# Patient Record
Sex: MALE | Race: WHITE | NOT HISPANIC OR LATINO | URBAN - METROPOLITAN AREA
[De-identification: names, ages, dates, MRNs, and addresses within clinical notes are randomized per-mention and may not be internally consistent; named-entity substitution may affect disease eponyms.]

---

## 2024-03-06 ENCOUNTER — OV NP (OUTPATIENT)
Dept: URBAN - METROPOLITAN AREA CLINIC 35 | Facility: CLINIC | Age: 54
End: 2024-03-06
Payer: COMMERCIAL

## 2024-03-06 ENCOUNTER — LAB (OUTPATIENT)
Dept: URBAN - METROPOLITAN AREA CLINIC 35 | Facility: CLINIC | Age: 54
End: 2024-03-06

## 2024-03-06 VITALS
HEIGHT: 68 IN | HEART RATE: 82 BPM | WEIGHT: 206 LBS | BODY MASS INDEX: 31.22 KG/M2 | OXYGEN SATURATION: 97 % | SYSTOLIC BLOOD PRESSURE: 122 MMHG | DIASTOLIC BLOOD PRESSURE: 86 MMHG

## 2024-03-06 DIAGNOSIS — R12 HEARTBURN: ICD-10-CM

## 2024-03-06 DIAGNOSIS — Z12.11 SCREENING FOR COLON CANCER: ICD-10-CM

## 2024-03-06 DIAGNOSIS — Z80.0 FAMILY HISTORY OF ESOPHAGEAL CANCER: ICD-10-CM

## 2024-03-06 PROCEDURE — 99203 OFFICE O/P NEW LOW 30 MIN: CPT | Performed by: STUDENT IN AN ORGANIZED HEALTH CARE EDUCATION/TRAINING PROGRAM

## 2024-03-06 RX ORDER — OMEGA-3/DHA/EPA/FISH OIL 60 MG-90MG
1 CAPSULE CAPSULE ORAL ONCE A DAY
Status: ACTIVE | COMMUNITY

## 2024-03-06 RX ORDER — LISINOPRIL 20 MG/1
1 TABLET TABLET ORAL ONCE A DAY
Status: ACTIVE | COMMUNITY

## 2024-03-06 RX ORDER — MULTIVITAMIN
1 TABLET TABLET ORAL ONCE A DAY
Status: ACTIVE | COMMUNITY

## 2024-03-06 NOTE — HPI-TODAY'S VISIT:
53 year old male new patient with family history of esophageal cancer (father) presents today for a consultation for Colorectal cancer screen. He has no previous history of Colonoscopy/EGD, patient reports 1-2 bowel movement per day with no blood, mucus or melena in stool. Stools are normal, no straining Denies rectal pain or pruritus ani and any related. No history of complications with anesthesia, no family hx of CRC. History intermittent heartburn with certain foods, like spicy foods, but doesn't take anything regularly Reports relatively infrequent. No issues swallowing. No history smoking.

## 2024-03-25 ENCOUNTER — COL/EGD (OUTPATIENT)
Dept: URBAN - METROPOLITAN AREA SURGERY CENTER 8 | Facility: SURGERY CENTER | Age: 54
End: 2024-03-25

## 2024-03-25 ENCOUNTER — LAB (OUTPATIENT)
Dept: URBAN - METROPOLITAN AREA CLINIC 4 | Facility: CLINIC | Age: 54
End: 2024-03-25
Payer: COMMERCIAL

## 2024-03-25 DIAGNOSIS — K63.89 OTHER SPECIFIED DISEASES OF INTESTINE: ICD-10-CM

## 2024-03-25 PROBLEM — 266433003: Status: ACTIVE | Noted: 2024-03-25

## 2024-03-25 PROCEDURE — 88305 TISSUE EXAM BY PATHOLOGIST: CPT | Performed by: PATHOLOGY

## 2024-03-25 RX ORDER — LISINOPRIL 20 MG/1
1 TABLET TABLET ORAL ONCE A DAY
Status: ACTIVE | COMMUNITY

## 2024-03-25 RX ORDER — OMEPRAZOLE 40 MG/1
1 CAPSULE 30 MINUTES BEFORE MORNING MEAL AND BEFORE SUPPER CAPSULE, DELAYED RELEASE ORAL TWICE A DAY
Qty: 180 | Refills: 2 | OUTPATIENT
Start: 2024-03-25

## 2024-03-25 RX ORDER — MULTIVITAMIN
1 TABLET TABLET ORAL ONCE A DAY
Status: ACTIVE | COMMUNITY

## 2024-03-25 RX ORDER — OMEGA-3/DHA/EPA/FISH OIL 60 MG-90MG
1 CAPSULE CAPSULE ORAL ONCE A DAY
Status: ACTIVE | COMMUNITY

## 2024-03-28 ENCOUNTER — COL/EGD (OUTPATIENT)
Dept: URBAN - METROPOLITAN AREA SURGERY CENTER 8 | Facility: SURGERY CENTER | Age: 54
End: 2024-03-28

## 2024-05-01 ENCOUNTER — LAB OUTSIDE AN ENCOUNTER (OUTPATIENT)
Dept: URBAN - METROPOLITAN AREA CLINIC 35 | Facility: CLINIC | Age: 54
End: 2024-05-01

## 2024-05-01 ENCOUNTER — OFFICE VISIT (OUTPATIENT)
Dept: URBAN - METROPOLITAN AREA CLINIC 35 | Facility: CLINIC | Age: 54
End: 2024-05-01
Payer: COMMERCIAL

## 2024-05-01 ENCOUNTER — DASHBOARD ENCOUNTERS (OUTPATIENT)
Age: 54
End: 2024-05-01

## 2024-05-01 VITALS
DIASTOLIC BLOOD PRESSURE: 88 MMHG | WEIGHT: 208 LBS | HEART RATE: 92 BPM | OXYGEN SATURATION: 97 % | HEIGHT: 68 IN | BODY MASS INDEX: 31.52 KG/M2 | SYSTOLIC BLOOD PRESSURE: 130 MMHG

## 2024-05-01 DIAGNOSIS — K21.00 GASTROESOPHAGEAL REFLUX DISEASE WITH ESOPHAGITIS WITHOUT HEMORRHAGE: ICD-10-CM

## 2024-05-01 DIAGNOSIS — Z12.11 SCREENING FOR COLON CANCER: ICD-10-CM

## 2024-05-01 DIAGNOSIS — Z80.0 FAMILY HISTORY OF ESOPHAGEAL CANCER: ICD-10-CM

## 2024-05-01 PROCEDURE — 99213 OFFICE O/P EST LOW 20 MIN: CPT | Performed by: STUDENT IN AN ORGANIZED HEALTH CARE EDUCATION/TRAINING PROGRAM

## 2024-05-01 RX ORDER — OMEGA-3/DHA/EPA/FISH OIL 60 MG-90MG
1 CAPSULE CAPSULE ORAL ONCE A DAY
Status: ACTIVE | COMMUNITY

## 2024-05-01 RX ORDER — MULTIVITAMIN
1 TABLET TABLET ORAL ONCE A DAY
Status: ACTIVE | COMMUNITY

## 2024-05-01 RX ORDER — LISINOPRIL 20 MG/1
1 TABLET TABLET ORAL ONCE A DAY
Status: ACTIVE | COMMUNITY

## 2024-05-01 RX ORDER — OMEPRAZOLE 40 MG/1
1 CAPSULE 30 MINUTES BEFORE MORNING MEAL AND BEFORE SUPPER CAPSULE, DELAYED RELEASE ORAL TWICE A DAY
Qty: 180 | Refills: 2 | Status: ACTIVE | COMMUNITY
Start: 2024-03-25

## 2024-06-27 ENCOUNTER — OFFICE VISIT (OUTPATIENT)
Dept: URBAN - METROPOLITAN AREA SURGERY CENTER 8 | Facility: SURGERY CENTER | Age: 54
End: 2024-06-27

## 2024-06-27 ENCOUNTER — OUT OF OFFICE VISIT (OUTPATIENT)
Dept: URBAN - METROPOLITAN AREA SURGERY CENTER 8 | Facility: SURGERY CENTER | Age: 54
End: 2024-06-27
Payer: COMMERCIAL

## 2024-06-27 DIAGNOSIS — K22.9 IRREGULAR Z LINE OF ESOPHAGUS: ICD-10-CM

## 2024-06-27 DIAGNOSIS — K20.80 ESOPHAGITIS, LOS ANGELES GRADE A: ICD-10-CM

## 2024-06-27 PROCEDURE — 00731 ANES UPR GI NDSC PX NOS: CPT | Performed by: NURSE ANESTHETIST, CERTIFIED REGISTERED

## 2024-06-27 RX ORDER — OMEGA-3/DHA/EPA/FISH OIL 60 MG-90MG
1 CAPSULE CAPSULE ORAL ONCE A DAY
Status: ACTIVE | COMMUNITY

## 2024-06-27 RX ORDER — MULTIVITAMIN
1 TABLET TABLET ORAL ONCE A DAY
Status: ACTIVE | COMMUNITY

## 2024-06-27 RX ORDER — OMEPRAZOLE 40 MG/1
1 CAPSULE 30 MINUTES BEFORE MORNING MEAL AND BEFORE SUPPER CAPSULE, DELAYED RELEASE ORAL TWICE A DAY
Qty: 180 | Refills: 2 | Status: ACTIVE | COMMUNITY
Start: 2024-03-25

## 2024-06-27 RX ORDER — LISINOPRIL 20 MG/1
1 TABLET TABLET ORAL ONCE A DAY
Status: ACTIVE | COMMUNITY

## 2024-07-17 ENCOUNTER — OFFICE VISIT (OUTPATIENT)
Dept: URBAN - METROPOLITAN AREA CLINIC 35 | Facility: CLINIC | Age: 54
End: 2024-07-17
Payer: COMMERCIAL

## 2024-07-17 VITALS
HEART RATE: 83 BPM | BODY MASS INDEX: 31.61 KG/M2 | DIASTOLIC BLOOD PRESSURE: 80 MMHG | SYSTOLIC BLOOD PRESSURE: 122 MMHG | OXYGEN SATURATION: 96 % | HEIGHT: 68 IN | WEIGHT: 208.6 LBS

## 2024-07-17 DIAGNOSIS — K21.00 GASTROESOPHAGEAL REFLUX DISEASE WITH ESOPHAGITIS WITHOUT HEMORRHAGE: ICD-10-CM

## 2024-07-17 DIAGNOSIS — Z80.0 FAMILY HISTORY OF ESOPHAGEAL CANCER: ICD-10-CM

## 2024-07-17 DIAGNOSIS — Z12.11 SCREENING FOR COLON CANCER: ICD-10-CM

## 2024-07-17 PROCEDURE — 99213 OFFICE O/P EST LOW 20 MIN: CPT | Performed by: STUDENT IN AN ORGANIZED HEALTH CARE EDUCATION/TRAINING PROGRAM

## 2024-07-17 RX ORDER — LISINOPRIL 20 MG/1
1 TABLET TABLET ORAL ONCE A DAY
Status: ACTIVE | COMMUNITY

## 2024-07-17 RX ORDER — OMEGA-3/DHA/EPA/FISH OIL 60 MG-90MG
1 CAPSULE CAPSULE ORAL ONCE A DAY
Status: ACTIVE | COMMUNITY

## 2024-07-17 RX ORDER — OMEPRAZOLE 40 MG/1
1 CAPSULE 30 MINUTES BEFORE MORNING MEAL AND BEFORE SUPPER CAPSULE, DELAYED RELEASE ORAL TWICE A DAY
Qty: 180 | Refills: 3
Start: 2024-03-25

## 2024-07-17 RX ORDER — MULTIVITAMIN
1 TABLET TABLET ORAL ONCE A DAY
Status: ACTIVE | COMMUNITY

## 2024-07-17 RX ORDER — OMEPRAZOLE 40 MG/1
1 CAPSULE 30 MINUTES BEFORE MORNING MEAL AND BEFORE SUPPER CAPSULE, DELAYED RELEASE ORAL TWICE A DAY
Qty: 180 | Refills: 2 | Status: ACTIVE | COMMUNITY
Start: 2024-03-25

## 2024-07-17 NOTE — HPI-TODAY'S VISIT:
53-year-old male presents for follow up for GERD with erosive esophagitis. Surveillance EGD as below. Taking omeprazole twice daily. Reflux symptoms well controlled. No breakthrough. Eating earlier before bed. Cut down caffeine, cut down alcohol intake. No dysphagia.    (6.27.24) EGD report shows: Normal hypopharynx. LA Grade A esophagitis. Z-line irregular. Biopsied. Esophagogastric landmarks identified. Gastroesophageal flap valve classified as Hill Grade III (minimal fold, loose to endoscope, hiatal hernia likely). Normal stomach. Normal duodenal bulb and second portion of the duodenum.    Path Esophagus, Distal, Biopsy (Cold Forceps): SQUAMOUS MUCOSA WITH REFLUX-TYPE CHANGES; NO COLUMNAR MUCOSA  IDENTIFIED. No Evidence of Mercer's Esophagus or Eosinophilic Esophagitis. Negative for Infectious organisms, Dysplasia or Malignancy.    Of last visit 5.1.24 53 year old male patient presents today for a follow-up post procedures(Colonoscopy/EGD) for GERD, CRC screening. Findings with LA grade D esophagiits, isolated benign mucosal sigmoid polyp. Taking omeprazole twice daily, no heartburn. No dysphagia.. No abdominal pain. BM normal. Has cut down caffeine. Tryng to get back in gym.   Colonoscopy 03/25/2024 - The descending colon, transverse colon, ascending colon and cecum are normal. - One 5 mm polyp in the sigmoid colon, removed with a cold snare.  Resected and retrieved. - The rectum is normal. - Internal hemorrhoids  EGD 03/25/2024 - Normal hypopharynx. - LA Grade D esophagitis. - Esophagogastric landmarks identified. - Small hiatal hernia. - Normal stomach. - Normal duodenal bulb and second portion of the duodenum. - No specimens collected.  Pathology report: Colon, Sigmoid, Polypectomy: BENIGN MUCOSAL POLYP(S). See Comment.Negative for Dysplasia or Malignancy. COMMENT: We use "benign mucosal polyp" to refer to an endoscopically polypoid lesion that shows nodysplastic or hyperplastic epithelium or classic features of hamartomatous polyp.  Last visit 03/06/2024  53 year old male new patient with family history of esophageal cancer (father) presents today for a consultation for Colorectal cancer screen. He has no previous history of Colonoscopy/EGD, patient reports 1-2 bowel movement per day with no blood, mucus or melena in stool. Stools are normal, no straining Denies rectal pain or pruritus ani and any related. No history of complications with anesthesia, no family hx of CRC. History intermittent heartburn with certain foods, like spicy foods, but doesn't take anything regularly Reports relatively infrequent. No issues swallowing. No history smoking.

## 2025-07-14 ENCOUNTER — ERX REFILL RESPONSE (OUTPATIENT)
Dept: URBAN - METROPOLITAN AREA CLINIC 35 | Facility: CLINIC | Age: 55
End: 2025-07-14

## 2025-07-14 RX ORDER — OMEPRAZOLE 40 MG/1
1 CAPSULE 30 MINUTES BEFORE MORNING MEAL AND BEFORE SUPPER ORALLY TWICE A DAY 90 DAYS CAPSULE, DELAYED RELEASE ORAL
Qty: 60 CAPSULE | Refills: 0

## 2025-07-16 ENCOUNTER — OFFICE VISIT (OUTPATIENT)
Dept: URBAN - METROPOLITAN AREA CLINIC 35 | Facility: CLINIC | Age: 55
End: 2025-07-16
Payer: COMMERCIAL

## 2025-07-16 DIAGNOSIS — Z12.11 SCREENING FOR COLON CANCER: ICD-10-CM

## 2025-07-16 DIAGNOSIS — Z80.0 FAMILY HISTORY OF ESOPHAGEAL CANCER: ICD-10-CM

## 2025-07-16 DIAGNOSIS — K21.00 GASTROESOPHAGEAL REFLUX DISEASE WITH ESOPHAGITIS WITHOUT HEMORRHAGE: ICD-10-CM

## 2025-07-16 PROCEDURE — 99213 OFFICE O/P EST LOW 20 MIN: CPT | Performed by: STUDENT IN AN ORGANIZED HEALTH CARE EDUCATION/TRAINING PROGRAM

## 2025-07-16 RX ORDER — OMEGA-3/DHA/EPA/FISH OIL 60 MG-90MG
1 CAPSULE CAPSULE ORAL ONCE A DAY
Status: ACTIVE | COMMUNITY

## 2025-07-16 RX ORDER — MULTIVITAMIN
1 TABLET TABLET ORAL ONCE A DAY
Status: ACTIVE | COMMUNITY

## 2025-07-16 RX ORDER — LISINOPRIL 20 MG/1
1 TABLET TABLET ORAL ONCE A DAY
Status: ACTIVE | COMMUNITY

## 2025-07-16 RX ORDER — OMEPRAZOLE 40 MG/1
1 CAPSULE 30 MINUTES BEFORE MORNING MEAL AND BEFORE SUPPER ORALLY TWICE A DAY 90 DAYS CAPSULE, DELAYED RELEASE ORAL
Qty: 60 CAPSULE | Refills: 0 | Status: ACTIVE | COMMUNITY

## 2025-07-16 RX ORDER — OMEPRAZOLE 40 MG/1
1 CAPSULE 1/2 TO 1 HOUR BEFORE MORNING MEAL CAPSULE, DELAYED RELEASE ORAL ONCE A DAY
Qty: 90 | Refills: 4
Start: 2025-07-16

## 2025-07-16 NOTE — HPI-TODAY'S VISIT:
54-year-old male presents for follow up for GERD with hx erosive esophagitis. No breakthrough symptoms. Taking omeprazole now 40 mg daily. No dysphagia. Reports last , lost dog, so less physically active. Gained some weight. Eating dinner earlier. Less caffeine intake. Less spicy foods. Elevated HOB   No recent labs/imaging will complete labs next month.   Last Visit(07/17/2024) 53-year-old male presents for follow up for GERD with erosive esophagitis. Surveillance EGD as below. Taking omeprazole twice daily. Reflux symptoms well controlled. No breakthrough. Eating earlier before bed. Cut down caffeine, cut down alcohol intake. No dysphagia.   (6.27.24) EGD report shows: Normal hypopharynx. LA Grade A esophagitis. Z-line irregular. Biopsied. Esophagogastric landmarks identified. Gastroesophageal flap valve classified as Hill Grade III (minimal fold, loose to endoscope, hiatal hernia likely). Normal stomach. Normal duodenal bulb and second portion of the duodenum.   Path Esophagus, Distal, Biopsy (Cold Forceps): SQUAMOUS MUCOSA WITH REFLUX-TYPE CHANGES; NO COLUMNAR MUCOSA  IDENTIFIED. No Evidence of Mercer's Esophagus or Eosinophilic Esophagitis. Negative for Infectious organisms, Dysplasia or Malignancy.    Of last visit 5.1.24 53 year old male patient presents today for a follow-up post procedures(Colonoscopy/EGD) for GERD, CRC screening. Findings with LA grade D esophagiits, isolated benign mucosal sigmoid polyp. Taking omeprazole twice daily, no heartburn. No dysphagia.. No abdominal pain. BM normal. Has cut down caffeine. Tryng to get back in gym.   Colonoscopy 03/25/2024 - The descending colon, transverse colon, ascending colon and cecum are normal. - One 5 mm polyp in the sigmoid colon, removed with a cold snare.  Resected and retrieved. - The rectum is normal. - Internal hemorrhoids  EGD 03/25/2024 - Normal hypopharynx. - LA Grade D esophagitis. - Esophagogastric landmarks identified. - Small hiatal hernia. - Normal stomach. - Normal duodenal bulb and second portion of the duodenum. - No specimens collected.  Pathology report: Colon, Sigmoid, Polypectomy: BENIGN MUCOSAL POLYP(S). See Comment.Negative for Dysplasia or Malignancy. COMMENT: We use "benign mucosal polyp" to refer to an endoscopically polypoid lesion that shows nodysplastic or hyperplastic epithelium or classic features of hamartomatous polyp.  Last visit 03/06/2024  53 year old male new patient with family history of esophageal cancer (father) presents today for a consultation for Colorectal cancer screen. He has no previous history of Colonoscopy/EGD, patient reports 1-2 bowel movement per day with no blood, mucus or melena in stool. Stools are normal, no straining Denies rectal pain or pruritus ani and any related. No history of complications with anesthesia, no family hx of CRC. History intermittent heartburn with certain foods, like spicy foods, but doesn't take anything regularly Reports relatively infrequent. No issues swallowing. No history smoking.

## 2025-08-05 ENCOUNTER — ERX REFILL RESPONSE (OUTPATIENT)
Dept: URBAN - METROPOLITAN AREA CLINIC 35 | Facility: CLINIC | Age: 55
End: 2025-08-05

## 2025-08-05 RX ORDER — OMEPRAZOLE 40 MG/1
1 CAPSULE 1/2 TO 1 HOUR BEFORE MORNING MEAL CAPSULE, DELAYED RELEASE ORAL ONCE A DAY
Qty: 90 | Refills: 4 | OUTPATIENT